# Patient Record
Sex: FEMALE | HISPANIC OR LATINO | Employment: UNEMPLOYED | URBAN - METROPOLITAN AREA
[De-identification: names, ages, dates, MRNs, and addresses within clinical notes are randomized per-mention and may not be internally consistent; named-entity substitution may affect disease eponyms.]

---

## 2023-01-10 ENCOUNTER — OFFICE VISIT (OUTPATIENT)
Dept: OBGYN CLINIC | Facility: CLINIC | Age: 28
End: 2023-01-10

## 2023-01-10 VITALS
BODY MASS INDEX: 32.02 KG/M2 | DIASTOLIC BLOOD PRESSURE: 77 MMHG | HEIGHT: 62 IN | SYSTOLIC BLOOD PRESSURE: 105 MMHG | WEIGHT: 174 LBS | HEART RATE: 102 BPM

## 2023-01-10 DIAGNOSIS — Z30.09 ENCOUNTER FOR COUNSELING REGARDING CONTRACEPTION: Primary | ICD-10-CM

## 2023-01-10 NOTE — PROGRESS NOTES
Assessment/Plan:    Encounter for counseling regarding contraception  Counseled extensively on various BC options   As patient w/ difficulty taking pills and hx of intolerance to other methods mentioned in HPI, recommend Mirena   Device information discussed in detail and handout provided to patient and additional information on AVS   She expresses understanding and wishes to proceed  Schedule apt for IUD insertion   Anticipatory guidance given  Pt expressed understanding, all questions answered        Diagnoses and all orders for this visit:    Encounter for counseling regarding contraception          Subjective:      Patient ID: Natividad Obrien is a  32 y o  female  Zapnip  #[id number]    HPI 33 yo new pt here for contraception  LMP 22 lasted 8 days  Prior BC trials have included depo, IUD, nexplanon  States she has difficulty remembering to take pills  Nicholes Notice states that she did not like depo or nexplanon  States the depo injections gave her headaches and the nexplanon caused her to gain weight  Has hx of copper IUD in place for 7 years and removed 2 months ago in [facility]  States she had a lot of cramping and irregular bleeding with the copper IUD and would prefer to not have that again  Denies chance of possible pregnancy as LMP as above and no encounters between when IUD was removed to now  No hx of STI  Hx of c section in [facility] about 10 years ago - she reports c section occurred d/t her o negative status and lack of health insurance for rhogam injection  No other pregnancies  No other reported health problems  The following portions of the patient's history were reviewed and updated as appropriate: allergies, current medications, past family history, past medical history, past social history, past surgical history and problem list     Review of Systems   Constitutional: Negative for fever     Gastrointestinal: Negative for abdominal pain, constipation, diarrhea and vomiting  Genitourinary: Positive for menstrual problem (in past; not current LMP regular; here for bc counseling)  Negative for dysuria, pelvic pain and vaginal pain  Psychiatric/Behavioral: The patient is not nervous/anxious  All other systems reviewed and are negative  Objective:      /77 (BP Location: Left arm, Patient Position: Sitting, Cuff Size: Adult)   Pulse 102   Ht 5' 2" (1 575 m)   Wt 78 9 kg (174 lb)   LMP 12/12/2022   BMI 31 83 kg/m²      Physical Exam  Vitals reviewed  Constitutional:       General: She is not in acute distress  Appearance: She is not ill-appearing, toxic-appearing or diaphoretic  HENT:      Head: Normocephalic and atraumatic  Mouth/Throat:      Mouth: Mucous membranes are moist    Eyes:      General: No scleral icterus  Cardiovascular:      Rate and Rhythm: Normal rate and regular rhythm  Heart sounds: Normal heart sounds  No murmur heard  Pulmonary:      Effort: No respiratory distress  Breath sounds: Normal breath sounds  No wheezing or rales  Abdominal:      General: Bowel sounds are normal  There is no distension  Palpations: Abdomen is soft  Tenderness: There is no abdominal tenderness  Skin:     General: Skin is warm  Neurological:      Mental Status: She is alert and oriented to person, place, and time     Psychiatric:         Mood and Affect: Mood normal          Behavior: Behavior normal

## 2023-01-10 NOTE — PATIENT INSTRUCTIONS
Levonorgestrel ([facility]) - (En el útero)   ECHO is off  Para qué se utiliza mauricio medicamento:   Sirve para prevenir Verlyn Hum y tratar sangrado menstrual abundante  Comuníquese de inmediato con un médico o enfermera si usted tiene:  Dolor de pecho, problemas para hablar o caminar, entumecimiento o debilidad  Genet sangrado vaginal, flujo vaginal, llagas genitales  Sangrado inusual, moretones o debilidad, piel u ojos amarillos  Dolor dennis el coito o hickman aislinn siente el plástico lynne del DIU dennis el coito  Distensión, dolor de estómago o en la pelvis, espasmos, sensibilidad o cólicos súbitos o intensos  Dolor de cedric, cambios en la visión     Efectos secundarios comunes:  Acné, caspa, piel grasa u otros cambios en la piel  Dolor o molestias en el seno    © Copyright [facility] 2022 Information is for [employer] use only and may not be sold, redistributed or otherwise used for commercial purposes

## 2023-01-10 NOTE — ASSESSMENT & PLAN NOTE
Counseled extensively on various BC options   As patient w/ difficulty taking pills and hx of intolerance to other methods mentioned in HPI, recommend Mirena   Device information discussed in detail and handout provided to patient and additional information on AVS   She expresses understanding and wishes to proceed  Schedule apt for IUD insertion   Anticipatory guidance given  Pt expressed understanding, all questions answered

## 2023-01-27 DIAGNOSIS — Z30.011 ENCOUNTER FOR INITIAL PRESCRIPTION OF CONTRACEPTIVE PILLS: Primary | ICD-10-CM

## 2023-01-27 RX ORDER — NORGESTIMATE AND ETHINYL ESTRADIOL 0.25-0.035
1 KIT ORAL DAILY
Qty: 28 TABLET | Refills: 11 | Status: SHIPPED | OUTPATIENT
Start: 2023-01-27

## 2023-02-13 ENCOUNTER — PATIENT OUTREACH (OUTPATIENT)
Dept: OBGYN CLINIC | Facility: CLINIC | Age: 28
End: 2023-02-13

## 2023-02-13 NOTE — PROGRESS NOTES
FAYE PERALES spoke with Pt to discussed [facility] program  Pt is a 26 y/o-S- P1- Citizen of [place] Speaking woman  Pt resides with her significant other and son  Pt does not have medical insurance and is interested on Mirena  Application was completed and faxed today  Pt aware of the waiting period

## 2023-05-16 ENCOUNTER — OFFICE VISIT (OUTPATIENT)
Dept: OBGYN CLINIC | Facility: CLINIC | Age: 28
End: 2023-05-16

## 2023-05-16 VITALS
DIASTOLIC BLOOD PRESSURE: 68 MMHG | WEIGHT: 180 LBS | SYSTOLIC BLOOD PRESSURE: 102 MMHG | BODY MASS INDEX: 29.99 KG/M2 | HEART RATE: 108 BPM | RESPIRATION RATE: 18 BRPM | HEIGHT: 65 IN

## 2023-05-16 DIAGNOSIS — Z97.5 IUD (INTRAUTERINE DEVICE) IN PLACE: Primary | ICD-10-CM

## 2023-05-16 DIAGNOSIS — Z78.9 LANGUAGE BARRIER: ICD-10-CM

## 2023-05-16 PROBLEM — Z75.8 LANGUAGE BARRIER: Status: ACTIVE | Noted: 2023-05-16

## 2023-05-16 PROBLEM — Z60.3 LANGUAGE BARRIER: Status: ACTIVE | Noted: 2023-05-16

## 2023-05-16 NOTE — PROGRESS NOTES
"Assessment/Plan:    No problem-specific Assessment & Plan notes found for this encounter  Patient to return to office for her annual GYN exam in 2 wks      Diagnoses and all orders for this visit:    IUD (intrauterine device) in place    Language barrier    Other orders  -     Levonorgestrel (MIRENA) 20 MCG/DAY IUD; 1 each by Intrauterine route once          Subjective:      Patient ID: Milana Lee is a 25 y o  female  HPI 40-year-old   here for IUD check, her Mirena IUD was placed 2023  She denies any problems with her iUD  She reports she has been amenorrheic  She denies any pelvic pain or pain with sex  She is happy with method  She is aware of irregular bleeding that can occur with Mirena  She needs annual and pap  The following portions of the patient's history were reviewed and updated as appropriate: allergies, current medications, past family history, past medical history, past social history, past surgical history and problem list     Review of Systems   Respiratory: Negative  Cardiovascular: Negative  Genitourinary: Negative for dyspareunia, menstrual problem and pelvic pain  Objective:      /68 (BP Location: Left arm, Patient Position: Sitting, Cuff Size: Adult)   Pulse (!) 108   Resp 18   Ht 5' 4 57\" (1 64 m)   Wt 81 6 kg (180 lb)   BMI 30 36 kg/m²          Physical Exam  Constitutional:       Appearance: Normal appearance  Cardiovascular:      Rate and Rhythm: Normal rate  Pulmonary:      Effort: Pulmonary effort is normal    Abdominal:      Palpations: Abdomen is soft  Tenderness: There is no abdominal tenderness  Skin:     General: Skin is warm and dry  Neurological:      Mental Status: She is oriented to person, place, and time         External genitalia-no lesions or erythema  Vagina-normal white discharge  Cervix-negative CMT no lesions, IUD strings visible,   Uterus-anteverted, nontender  Adnexa-no masses nontender      "

## 2023-06-13 ENCOUNTER — ANNUAL EXAM (OUTPATIENT)
Dept: OBGYN CLINIC | Facility: CLINIC | Age: 28
End: 2023-06-13

## 2023-06-13 VITALS
BODY MASS INDEX: 29.82 KG/M2 | HEART RATE: 99 BPM | SYSTOLIC BLOOD PRESSURE: 104 MMHG | DIASTOLIC BLOOD PRESSURE: 69 MMHG | WEIGHT: 179 LBS | RESPIRATION RATE: 18 BRPM | HEIGHT: 65 IN

## 2023-06-13 DIAGNOSIS — Z11.51 SCREENING FOR HUMAN PAPILLOMAVIRUS (HPV): ICD-10-CM

## 2023-06-13 DIAGNOSIS — Z78.9 LANGUAGE BARRIER: ICD-10-CM

## 2023-06-13 DIAGNOSIS — Z11.3 SCREEN FOR STD (SEXUALLY TRANSMITTED DISEASE): ICD-10-CM

## 2023-06-13 DIAGNOSIS — Z01.419 ENCOUNTER FOR GYNECOLOGICAL EXAMINATION WITHOUT ABNORMAL FINDING: Primary | ICD-10-CM

## 2023-06-13 DIAGNOSIS — Z97.5 IUD (INTRAUTERINE DEVICE) IN PLACE: ICD-10-CM

## 2023-06-13 DIAGNOSIS — Z23 NEED FOR HPV VACCINATION: ICD-10-CM

## 2023-06-13 PROCEDURE — 99395 PREV VISIT EST AGE 18-39: CPT | Performed by: NURSE PRACTITIONER

## 2023-06-13 PROCEDURE — 87491 CHLMYD TRACH DNA AMP PROBE: CPT | Performed by: NURSE PRACTITIONER

## 2023-06-13 PROCEDURE — 90471 IMMUNIZATION ADMIN: CPT | Performed by: NURSE PRACTITIONER

## 2023-06-13 PROCEDURE — G0476 HPV COMBO ASSAY CA SCREEN: HCPCS | Performed by: NURSE PRACTITIONER

## 2023-06-13 PROCEDURE — 90651 9VHPV VACCINE 2/3 DOSE IM: CPT | Performed by: NURSE PRACTITIONER

## 2023-06-13 PROCEDURE — G0145 SCR C/V CYTO,THINLAYER,RESCR: HCPCS | Performed by: NURSE PRACTITIONER

## 2023-06-13 PROCEDURE — 87591 N.GONORRHOEAE DNA AMP PROB: CPT | Performed by: NURSE PRACTITIONER

## 2023-06-13 NOTE — PROGRESS NOTES
ASSESSMENT & PLAN: Desmond Holder is a 25 y o   with normal gynecologic exam     1   Routine well woman exam done today  2  Pap and HPV:  The patient's last pap and hpv was unknown  Pap and cotesting was done today  Current ASCCP Guidelines reviewed  3   The following were reviewed in today's visit: breast self exam, STD testing, adequate intake of calcium and vitamin D, exercise and healthy diet  4  Gardisil vaccine in women up to age 39 discussed and information was provided  Patient has never received, would like to begin today, Gardasil No  1 given, return to office in 2 months for Gardasil No  2  5  Mirena IUD was placed 2023 effective until       Depression Screening Follow-up Plan: Patient's depression screening was negative with a PHQ-2 score of 0   Their PHQ-9 score was 0  Clinically patient does not have depression  No treatment is required  BMI Counseling: Body mass index is 30 19 kg/m²  The BMI is above normal  Nutrition recommendations include 3-5 servings of fruits/vegetables daily, moderation in carbohydrate intake and reducing intake of cholesterol  Exercise recommendations include exercising 3-5 times per week  CC:  Annual Gynecologic Examination    HPI: Desmond Holder is a 25 y o   who presents for annual gynecologic examination   used [id number]   Had a Mirena IUD placed 2023, happy with method  Health Maintenance:    She wears her seatbelt routinely  She does perform irregular monthly self breast exams  She feels safe at home  History reviewed  No pertinent past medical history  History reviewed  No pertinent surgical history  Past OB/Gyn History:  OB History        1    Para   1    Term                AB        Living   1       SAB        IAB        Ectopic        Multiple        Live Births   1               Pt does not have menstrual issues   Monthly x 7 days, regular  History of sexually transmitted infection: No   History of abnormal pap smears: No      Patient is currently sexually active  heterosexual   The current method of family planning is IUD  History reviewed  No pertinent family history  Social History:  Social History     Socioeconomic History   • Marital status: /Civil Union     Spouse name: Not on file   • Number of children: Not on file   • Years of education: Not on file   • Highest education level: Not on file   Occupational History   • Not on file   Tobacco Use   • Smoking status: Never   • Smokeless tobacco: Never   Vaping Use   • Vaping Use: Never used   Substance and Sexual Activity   • Alcohol use: Not on file   • Drug use: Never   • Sexual activity: Yes   Other Topics Concern   • Not on file   Social History Narrative   • Not on file     Social Determinants of Health     Financial Resource Strain: Low Risk  (4/13/2023)    Overall Financial Resource Strain (CARDIA)    • Difficulty of Paying Living Expenses: Not hard at all   Food Insecurity: No Food Insecurity (4/13/2023)    Hunger Vital Sign    • Worried About Running Out of Food in the Last Year: Never true    • Ran Out of Food in the Last Year: Never true   Transportation Needs: No Transportation Needs (4/13/2023)    PRAPARE - Transportation    • Lack of Transportation (Medical): No    • Lack of Transportation (Non-Medical): No   Physical Activity: Not on file   Stress: Not on file   Social Connections: Not on file   Intimate Partner Violence: Not on file   Housing Stability: Low Risk  (4/13/2023)    Housing Stability Vital Sign    • Unable to Pay for Housing in the Last Year: No    • Number of Places Lived in the Last Year: 1    • Unstable Housing in the Last Year: No     Presently lives with family  Patient is     Patient is currently employed     No Known Allergies      Current Outpatient Medications:   •  Levonorgestrel (MIRENA) 20 MCG/DAY IUD, 1 each by Intrauterine route once, Disp: , Rfl: "      Review of Systems  Constitutional :no fever, feels well, no tiredness, no recent weight gain or loss  ENT: no ear ache, no loss of hearing, no nosebleeds or nasal discharge, no sore throat or hoarseness  Cardiovascular: no complaints of slow or fast heart beat, no chest pain, no palpitations, no leg claudication or lower extremity edema  Respiratory: no complaints of shortness of shortness of breath, no BURKS  Breasts:no complaints of breast pain, breast lump, or nipple discharge  Gastrointestinal: no complaints of abdominal pain, constipation, nausea, vomiting, or diarrhea or bloody stools  Genitourinary : no complaints of dysuria, incontinence, pelvic pain, no dysmenorrhea, vaginal discharge or abnormal vaginal bleeding and as noted in HPI  Musculoskeletal: no complaints of arthralgia, no myalgia, no joint swelling or stiffness, no limb pain or swelling    Integumentary: no complaints of skin rash or lesion, itching or dry skin  Neurological: no complaints of headache, no confusion, no numbness or tingling, no dizziness or fainting    Objective      /69 (BP Location: Right arm, Patient Position: Standing, Cuff Size: Adult)   Pulse 99   Resp 18   Ht 5' 4 57\" (1 64 m)   Wt 81 2 kg (179 lb)   LMP 05/25/2023 (Exact Date)   BMI 30 19 kg/m²   General:   appears stated age, cooperative, alert normal mood and affect   Neck: normal, supple,trachea midline, no masses   Heart: regular rate and rhythm, S1, S2 normal, no murmur, click, rub or gallop   Lungs: clear to auscultation bilaterally   Breasts: normal appearance, no masses or tenderness, Inspection negative, No nipple retraction or dimpling, No nipple discharge or bleeding, No axillary or supraclavicular adenopathy, Normal to palpation without dominant masses, Taught monthly breast self examination   Abdomen: soft, non-tender, without masses or organomegaly   Vulva: normal female genitalia, Bartholin's, Urethra, Bladen normal   Vagina: normal vagina, " no discharge, exudate, lesion, or erythema   Urethra: normal   Cervix: Normal, no discharge  PAP done  IUD strings present  GCC done  Nontender  Uterus: normal size, contour, position, consistency, mobility, non-tender and anteverted   Adnexa: normal adnexa and no mass, fullness, tenderness   Lymphatic palpation of lymph nodes in neck, axilla, groin and/or other locations: no lymphadenopathy or masses noted   Skin normal skin turgor and no rashes     Psychiatric orientation to person, place, and time: normal  mood and affect: normal

## 2023-06-15 LAB
C TRACH DNA SPEC QL NAA+PROBE: NEGATIVE
N GONORRHOEA DNA SPEC QL NAA+PROBE: NEGATIVE

## 2023-06-16 ENCOUNTER — TELEPHONE (OUTPATIENT)
Dept: OBGYN CLINIC | Facility: CLINIC | Age: 28
End: 2023-06-16

## 2023-06-16 LAB
HPV HR 12 DNA CVX QL NAA+PROBE: POSITIVE
HPV16 DNA CVX QL NAA+PROBE: NEGATIVE
HPV18 DNA CVX QL NAA+PROBE: NEGATIVE

## 2023-06-16 NOTE — TELEPHONE ENCOUNTER
----- Message from Ariella Cannon, [address] sent at 6/16/2023 10:40 AM EDT -----  Please inform pt that her culture for chlamydia and gonorrhea were negative  Her HPV is positive for other, awaiting on pap results  Thank You!

## 2023-06-16 NOTE — TELEPHONE ENCOUNTER
ID [id number]    Attempted to call, left a message asking for patient to call the office  Office number provided in message

## 2023-06-19 ENCOUNTER — TELEPHONE (OUTPATIENT)
Dept: OBGYN CLINIC | Facility: CLINIC | Age: 28
End: 2023-06-19

## 2023-06-19 NOTE — TELEPHONE ENCOUNTER
ID [id number]    Attempted to call, left a message referring patient to my chart for test results, office number provided

## 2023-06-19 NOTE — TELEPHONE ENCOUNTER
----- Message from Trice Jones, [address]  sent at 6/16/2023 10:40 AM EDT -----  Please inform pt that her culture for chlamydia and gonorrhea were negative  Her HPV is positive for other, awaiting on pap results  Thank You!

## 2023-06-20 LAB
LAB AP GYN PRIMARY INTERPRETATION: NORMAL
Lab: NORMAL
PATH INTERP SPEC-IMP: NORMAL

## 2023-06-21 ENCOUNTER — TELEPHONE (OUTPATIENT)
Dept: OBGYN CLINIC | Facility: CLINIC | Age: 28
End: 2023-06-21

## 2023-06-21 NOTE — TELEPHONE ENCOUNTER
ID [id number]    Attempted to call, left a message asking for patient to call the office   Office number provided in message

## 2023-06-21 NOTE — TELEPHONE ENCOUNTER
----- Message from  , Michael Dawkins sent at 6/21/2023 10:56 AM EDT -----  Please call Hakeem Boateng and let her know that her Pap was negative, but positive for HPV recommend Pap and cotest in 1 year  Her Pap did suggest BV please inquire if she has any symptoms    Thank you

## 2023-06-22 ENCOUNTER — TELEPHONE (OUTPATIENT)
Dept: OBGYN CLINIC | Facility: CLINIC | Age: 28
End: 2023-06-22

## 2023-06-22 NOTE — TELEPHONE ENCOUNTER
----- Message from Emy Lo [place] sent at 6/21/2023 10:56 AM EDT -----  Please call Opal Hauser and let her know that her Pap was negative, but positive for HPV recommend Pap and cotest in 1 year  Her Pap did suggest BV please inquire if she has any symptoms    Thank you

## 2023-06-27 ENCOUNTER — TELEPHONE (OUTPATIENT)
Dept: OBGYN CLINIC | Facility: CLINIC | Age: 28
End: 2023-06-27

## 2023-06-27 DIAGNOSIS — B96.89 BACTERIAL VAGINOSIS: Primary | ICD-10-CM

## 2023-06-27 DIAGNOSIS — N76.0 BACTERIAL VAGINOSIS: Primary | ICD-10-CM

## 2023-06-27 RX ORDER — METRONIDAZOLE 500 MG/1
500 TABLET ORAL EVERY 12 HOURS SCHEDULED
Qty: 14 TABLET | Refills: 0 | Status: SHIPPED | OUTPATIENT
Start: 2023-06-27 | End: 2023-07-04

## 2023-08-12 PROBLEM — Z11.3 SCREEN FOR STD (SEXUALLY TRANSMITTED DISEASE): Status: RESOLVED | Noted: 2023-06-13 | Resolved: 2023-08-12

## 2023-08-12 PROBLEM — Z11.51 SCREENING FOR HUMAN PAPILLOMAVIRUS (HPV): Status: RESOLVED | Noted: 2023-06-13 | Resolved: 2023-08-12

## 2023-08-14 ENCOUNTER — CLINICAL SUPPORT (OUTPATIENT)
Dept: OBGYN CLINIC | Facility: CLINIC | Age: 28
End: 2023-08-14

## 2023-08-14 DIAGNOSIS — Z23 NEED FOR VACCINATION: Primary | ICD-10-CM

## 2023-08-14 PROCEDURE — 90471 IMMUNIZATION ADMIN: CPT

## 2023-08-14 PROCEDURE — 90651 9VHPV VACCINE 2/3 DOSE IM: CPT

## 2023-11-13 ENCOUNTER — CLINICAL SUPPORT (OUTPATIENT)
Dept: OBGYN CLINIC | Facility: CLINIC | Age: 28
End: 2023-11-13

## 2023-11-13 DIAGNOSIS — Z23 NEED FOR VACCINATION: Primary | ICD-10-CM

## 2023-11-13 PROCEDURE — 90651 9VHPV VACCINE 2/3 DOSE IM: CPT

## 2023-11-13 PROCEDURE — 90471 IMMUNIZATION ADMIN: CPT

## 2024-02-19 ENCOUNTER — HOSPITAL ENCOUNTER (EMERGENCY)
Facility: HOSPITAL | Age: 29
Discharge: HOME | End: 2024-02-19
Attending: EMERGENCY MEDICINE | Admitting: EMERGENCY MEDICINE

## 2024-02-19 ENCOUNTER — APPOINTMENT (EMERGENCY)
Dept: RADIOLOGY | Facility: HOSPITAL | Age: 29
End: 2024-02-19

## 2024-02-19 VITALS
RESPIRATION RATE: 16 BRPM | BODY MASS INDEX: 29.68 KG/M2 | WEIGHT: 178.13 LBS | DIASTOLIC BLOOD PRESSURE: 67 MMHG | HEART RATE: 107 BPM | SYSTOLIC BLOOD PRESSURE: 105 MMHG | OXYGEN SATURATION: 99 % | HEIGHT: 65 IN | TEMPERATURE: 98.4 F

## 2024-02-19 DIAGNOSIS — M25.561 ACUTE PAIN OF RIGHT KNEE: Primary | ICD-10-CM

## 2024-02-19 PROCEDURE — 73564 X-RAY EXAM KNEE 4 OR MORE: CPT

## 2024-02-19 PROCEDURE — 99284 EMERGENCY DEPT VISIT MOD MDM: CPT | Performed by: EMERGENCY MEDICINE

## 2024-02-19 PROCEDURE — 96372 THER/PROPH/DIAG INJ SC/IM: CPT

## 2024-02-19 PROCEDURE — 99283 EMERGENCY DEPT VISIT LOW MDM: CPT

## 2024-02-19 RX ORDER — IBUPROFEN 600 MG/1
600 TABLET ORAL EVERY 6 HOURS PRN
Qty: 30 TABLET | Refills: 0 | Status: SHIPPED | OUTPATIENT
Start: 2024-02-19

## 2024-02-19 RX ORDER — KETOROLAC TROMETHAMINE 30 MG/ML
30 INJECTION, SOLUTION INTRAMUSCULAR; INTRAVENOUS ONCE
Status: COMPLETED | OUTPATIENT
Start: 2024-02-19 | End: 2024-02-19

## 2024-02-19 RX ADMIN — KETOROLAC TROMETHAMINE 30 MG: 30 INJECTION, SOLUTION INTRAMUSCULAR; INTRAVENOUS at 21:03

## 2024-02-20 NOTE — ED PROVIDER NOTES
History  Chief Complaint   Patient presents with    Knee Swelling     Reports R knee pain and swelling since Friday, worse when applying pressure and using stairs     29-year-old female presents to the emergency department for evaluation of knee pain.  Patient reports worsening right knee pain over the past 3 days.  States that she has been using Motrin and naproxen with only minimal relief so came to the emergency department for further evaluation.  She denies any recent falls or direct trauma to the area.  States that the pain is worse when she bends her knees and when she is going upstairs.  Denies prior history of similar.  No fevers, chills, nausea, vomiting, diarrhea, shortness of breath and lower leg pain/swelling.        Prior to Admission Medications   Prescriptions Last Dose Informant Patient Reported? Taking?   Levonorgestrel (MIRENA) 20 MCG/DAY IUD  Self Yes No   Si each by Intrauterine route once      Facility-Administered Medications: None       History reviewed. No pertinent past medical history.    Past Surgical History:   Procedure Laterality Date     SECTION         Family History   Problem Relation Age of Onset    No Known Problems Mother     No Known Problems Father     No Known Problems Son     No Known Problems Maternal Grandmother     Breast cancer Neg Hx     Colon cancer Neg Hx     Ovarian cancer Neg Hx      I have reviewed and agree with the history as documented.    E-Cigarette/Vaping    E-Cigarette Use Never User      E-Cigarette/Vaping Substances     Social History     Tobacco Use    Smoking status: Never    Smokeless tobacco: Never   Vaping Use    Vaping status: Never Used   Substance Use Topics    Alcohol use: Not Currently    Drug use: Never       Review of Systems   Constitutional:  Negative for chills and fever.   HENT:  Negative for ear pain and sore throat.    Eyes:  Negative for pain and visual disturbance.   Respiratory:  Negative for cough and shortness of breath.     Cardiovascular:  Negative for chest pain and palpitations.   Gastrointestinal:  Negative for abdominal pain and vomiting.   Genitourinary:  Negative for dysuria and hematuria.   Musculoskeletal:  Negative for arthralgias and back pain.   Skin:  Negative for color change and rash.   Neurological:  Negative for seizures and syncope.   All other systems reviewed and are negative.      Physical Exam  Physical Exam  Vitals and nursing note reviewed.   Constitutional:       General: She is not in acute distress.     Appearance: She is well-developed.   HENT:      Head: Normocephalic and atraumatic.   Eyes:      Conjunctiva/sclera: Conjunctivae normal.   Cardiovascular:      Rate and Rhythm: Regular rhythm. Tachycardia present.      Pulses:           Dorsalis pedis pulses are 2+ on the right side and 2+ on the left side.      Heart sounds: No murmur heard.  Pulmonary:      Effort: Pulmonary effort is normal. No respiratory distress.      Breath sounds: Normal breath sounds.   Abdominal:      Palpations: Abdomen is soft.      Tenderness: There is no abdominal tenderness.   Musculoskeletal:         General: Tenderness present. No swelling.      Cervical back: Neck supple.      Right knee: Tenderness present.      Left knee: Normal.   Skin:     General: Skin is warm and dry.      Capillary Refill: Capillary refill takes less than 2 seconds.   Neurological:      Mental Status: She is alert.   Psychiatric:         Mood and Affect: Mood normal.         Vital Signs  ED Triage Vitals [02/19/24 2044]   Temperature Pulse Respirations Blood Pressure SpO2   98.4 °F (36.9 °C) (!) 107 16 105/67 99 %      Temp Source Heart Rate Source Patient Position - Orthostatic VS BP Location FiO2 (%)   Oral Monitor Sitting Left arm --      Pain Score       10 - Worst Possible Pain           Vitals:    02/19/24 2044   BP: 105/67   Pulse: (!) 107   Patient Position - Orthostatic VS: Sitting         Visual Acuity      ED Medications  Medications    ketorolac (TORADOL) injection 30 mg (30 mg Intramuscular Given 2/19/24 2103)       Diagnostic Studies  Results Reviewed       None                   XR knee 4+ views Right injury   ED Interpretation by Wilfredo Lucio MD (02/19 2134)   No acute fracture or dislocation      Final Result by Gokul Esquivel MD (02/20 1512)      No acute osseous abnormality.            Workstation performed: [id number]                    Procedures  Procedures         ED Course                               SBIRT 20yo+      Flowsheet Row Most Recent Value   Initial Alcohol Screen: US AUDIT-C     1. How often do you have a drink containing alcohol? 0 Filed at: 02/19/2024 2059   2. How many drinks containing alcohol do you have on a typical day you are drinking?  0 Filed at: 02/19/2024 2059   3a. Male UNDER 65: How often do you have five or more drinks on one occasion? 0 Filed at: 02/19/2024 2059   3b. FEMALE Any Age, or MALE 65+: How often do you have 4 or more drinks on one occassion? 0 Filed at: 02/19/2024 2059   Audit-C Score 0 Filed at: 02/19/2024 2059   ELIDA: How many times in the past year have you...    Used an illegal drug or used a prescription medication for non-medical reasons? Never Filed at: 02/19/2024 2059                      Medical Decision Making  29-year-old female presented to the emergency department for evaluation of knee pain.  On arrival the patient was awake, alert, oriented and in no acute distress.  On exam the patient had tenderness to the medial aspect of her right knee.  Physical exam was otherwise unremarkable.  The patient's knee was stable.  Imaging done in the emergency department on my interpretation with no acute fracture or dislocation.  Patient was treated symptomatically with Toradol.  On reevaluation the patient reported some improvement of symptoms.  All diagnostic studies were discussed with the patient in detail.  She is appropriate for discharge.  Recommendation was made for the  patient to follow-up with orthopedic surgery for continued symptoms.  An ambulatory referral was placed.  Return precautions were discussed.    Patient agrees with the plan for discharge and feels comfortable to go home with proper f/u. Advised to return for worsening or additional problems. Diagnostic tests were reviewed and questions answered. Diagnosis, care plan and treatment options were discussed. The patient understands instructions and will follow up as directed.        Amount and/or Complexity of Data Reviewed  Radiology: ordered and independent interpretation performed.    Risk  Prescription drug management.             Disposition  Final diagnoses:   Acute pain of right knee     Time reflects when diagnosis was documented in both MDM as applicable and the Disposition within this note       Time User Action Codes Description Comment    2/19/2024  9:35 PM Wilfredo Lucio Add [M25.561] Acute pain of right knee           ED Disposition       ED Disposition   Discharge    Condition   Stable    Date/Time   Mon Feb 19, 2024 2135    Comment   Christina West discharge to home/self care.                   Follow-up Information       Follow up With Specialties Details Why Contact Info Additional Information    [facility] Schedule an appointment as soon as possible for a visit   [address] [place] [address] [phone] [facility] [address] [address] [phone]    [facility] Schedule an appointment as soon as possible for a visit   [address] [place] [address] [phone] [facility], [address] [place] [address], [place], [place], [address] [phone]    [facility] Emergency Department Emergency Medicine Go to  If symptoms worsen [address] [place] [address]  [facility] [address]  [phone] [facility] Emergency Department, [address] [place] [id number]            Discharge Medication List as of 2/19/2024  9:39 PM        START taking these medications    Details   ibuprofen (MOTRIN) 600 mg tablet Take 1 tablet (600 mg total) by mouth every 6 (six) hours as needed for mild pain or moderate pain, Starting Mon 2/19/2024, Normal           CONTINUE these medications which have NOT CHANGED    Details   Levonorgestrel (MIRENA) 20 MCG/DAY IUD 1 each by Intrauterine route once, Historical Med                 PDMP Review       None            ED Provider  Electronically Signed by             Wilfredo Lucio MD  02/20/24 8310

## 2024-02-20 NOTE — ED NOTES
Discharge instructions reviewed with pt. Pt verbalized understanding. And has no further questions at this time. Pt ambulatory off unit with steady gait.      Kia De Oliveira RN  02/19/24 5997

## 2024-02-21 PROBLEM — Z01.419 ENCOUNTER FOR GYNECOLOGICAL EXAMINATION WITHOUT ABNORMAL FINDING: Status: RESOLVED | Noted: 2023-06-13 | Resolved: 2024-02-21

## 2024-03-06 ENCOUNTER — OFFICE VISIT (OUTPATIENT)
Dept: OBGYN CLINIC | Facility: CLINIC | Age: 29
End: 2024-03-06

## 2024-03-06 VITALS
DIASTOLIC BLOOD PRESSURE: 73 MMHG | HEIGHT: 65 IN | BODY MASS INDEX: 29.69 KG/M2 | HEART RATE: 92 BPM | WEIGHT: 178.2 LBS | SYSTOLIC BLOOD PRESSURE: 109 MMHG | RESPIRATION RATE: 16 BRPM

## 2024-03-06 DIAGNOSIS — M25.561 ACUTE PAIN OF RIGHT KNEE: ICD-10-CM

## 2024-03-06 DIAGNOSIS — M23.91 INTERNAL DERANGEMENT OF RIGHT KNEE: Primary | ICD-10-CM

## 2024-03-06 PROCEDURE — 99244 OFF/OP CNSLTJ NEW/EST MOD 40: CPT | Performed by: STUDENT IN AN ORGANIZED HEALTH CARE EDUCATION/TRAINING PROGRAM

## 2024-03-06 RX ORDER — IBUPROFEN 600 MG/1
600 TABLET ORAL EVERY 6 HOURS PRN
Qty: 30 TABLET | Refills: 0 | Status: SHIPPED | OUTPATIENT
Start: 2024-03-06

## 2024-03-06 NOTE — LETTER
March 6, 2024     Wilfredo Lucio MD  [address] [place] [id number]    Patient: Christina West   YOB: 1995   Date of Visit: 3/6/2024       Dear Dr. Lucio:    Thank you for referring Christina West to me for evaluation. Below are my notes for this consultation.    If you have questions, please do not hesitate to call me. I look forward to following your patient along with you.         Sincerely,        Calvin Cheung, DO        CC: No Recipients    Calvin Cheung DO  3/6/2024  6:43 PM  Incomplete  Ortho Sports Medicine Knee New Patient Visit     Assesment:   29 y.o. female right knee internal derangement with concern for meniscus tear    Plan:    Christina presents today for right knee pain. The Ipad was used for Mongolian interpretation during today's visit. Her exam is consistent with internal derangement with a concern for a meniscus tear. I recommend we get an MRI for further evaluation. She describes mechanical catching and locking in her medial knee. I provided her with a outpatient physical therapy referral to work on range of motion and reducing swelling. I will see her back following her MRI.    Conservative treatment:    PT for ROM/strengthening to knee, hip and core.  OTC NSAIDS prn for pain.  Tylenol for pain.  Let pain guide gradual return activities.    Imaging:    All imaging from today was reviewed by myself and explained to the patient.       Injection:    No Injection planned at this time.      Surgery:     No surgery is recommended at this point, continue with conservative treatment plan as noted.      Follow up:    No follow-ups on file.        Chief Complaint   Patient presents with   • Right Knee - Pain       History of Present Illness:    The patient is a 29 y.o. female who presents for right knee pain. She has pain over the medial aspect of her knee. She notes having pain during prolonged walking and deep knee flexion. She notes having crunching  sounds in her knee during walking. She notes one time squatting to  her newspaper and having pain following. She has been taking ibuprofen to help with her pain. She has not had any formal treatment. She denies any numbness and tingling.  She does endorse mechanical catching and locking in the knee.      Knee Surgical History:  None    Past Medical, Social and Family History:  History reviewed. No pertinent past medical history.  Past Surgical History:   Procedure Laterality Date   •  SECTION       No Known Allergies  Current Outpatient Medications on File Prior to Visit   Medication Sig Dispense Refill   • ibuprofen (MOTRIN) 600 mg tablet Take 1 tablet (600 mg total) by mouth every 6 (six) hours as needed for mild pain or moderate pain 30 tablet 0   • Levonorgestrel (MIRENA) 20 MCG/DAY IUD 1 each by Intrauterine route once       No current facility-administered medications on file prior to visit.     Social History     Socioeconomic History   • Marital status: /Civil Union     Spouse name: Not on file   • Number of children: Not on file   • Years of education: Not on file   • Highest education level: Not on file   Occupational History   • Not on file   Tobacco Use   • Smoking status: Never   • Smokeless tobacco: Never   Vaping Use   • Vaping status: Never Used   Substance and Sexual Activity   • Alcohol use: Not Currently   • Drug use: Never   • Sexual activity: Yes     Birth control/protection: I.U.D.   Other Topics Concern   • Not on file   Social History Narrative   • Not on file     Social Determinants of Health     Financial Resource Strain: Low Risk  (2023)    Overall Financial Resource Strain (CARDIA)    • Difficulty of Paying Living Expenses: Not hard at all   Food Insecurity: No Food Insecurity (2023)    Hunger Vital Sign    • Worried About Running Out of Food in the Last Year: Never true    • Ran Out of Food in the Last Year: Never true   Transportation Needs: No  "Transportation Needs (4/13/2023)    PRAPARE - Transportation    • Lack of Transportation (Medical): No    • Lack of Transportation (Non-Medical): No   Physical Activity: Not on file   Stress: Not on file   Social Connections: Not on file   Intimate Partner Violence: Not on file   Housing Stability: Low Risk  (4/13/2023)    Housing Stability Vital Sign    • Unable to Pay for Housing in the Last Year: No    • Number of Places Lived in the Last Year: 1    • Unstable Housing in the Last Year: No         I have reviewed the past medical, surgical, social and family history, medications and allergies as documented in the EMR.    Review of systems: ROS is negative other than that noted in the HPI.  Constitutional: Negative for fatigue and fever.   HENT: Negative for sore throat.    Respiratory: Negative for shortness of breath.    Cardiovascular: Negative for chest pain.   Gastrointestinal: Negative for abdominal pain.   Endocrine: Negative for cold intolerance and heat intolerance.   Genitourinary: Negative for flank pain.   Musculoskeletal: Negative for back pain.   Skin: Negative for rash.   Allergic/Immunologic: Negative for immunocompromised state.   Neurological: Negative for dizziness.   Psychiatric/Behavioral: Negative for agitation.      Physical Exam:    Blood pressure 109/73, pulse 92, resp. rate 16, height 5' 4.7\" (1.643 m), weight 80.8 kg (178 lb 3.2 oz), last menstrual period 01/20/2024.    General/Constitutional: NAD, well developed, well nourished  HENT: Normocephalic, atraumatic  CV: Intact distal pulses, regular rate  Resp: No respiratory distress or labored breathing  Lymphatic: No lymphadenopathy palpated  Neuro: Alert and Oriented x 3, no focal deficits  Psych: Normal mood, normal affect, normal judgement, normal behavior  Skin: Warm, dry, no rashes, no erythema      Knee Exam (focused):  Visual inspection of the right knee demonstrates normal contour without atrophy.   no previous incisions   There is " no significant erythema or edema.    No significant joint effusion   Range of motion is full from 0-110 degrees of flexion   Able to straight leg raise   (+) medial joint line tenderness, (-) lateral joint line tenderness  (+) medial Carlos Manuel's, (-) lateral Carlos Manuel's  1A Lachman exam, (-) posterior drawer  Stable to varus and valgus stress at both 0 and 30°  Patella tracks normally.  No J sign.  No apprehension.  Translation is approximately 2 quadrants and is equal to the contralateral side  Patellar eversion is similar to the contralateral side    Examination of the patient's ipsilateral hip demonstrates full painless range of motion.  No crepitus.      LE NV Exam: +2 DP/PT pulses bilaterally  Sensation intact to light touch L2-S1 bilaterally     Bilateral hip ROM demonstrates no pain actively or passively    No calf tenderness to palpation bilaterally    Knee Imaging    X-rays of the right knee were reviewed, which demonstrate no osseous abnormalities.  I have reviewed the radiology report and agree with their impression.      Scribe Attestation      I,:  Ambrose Reilly am acting as a scribe while in the presence of the attending physician.:       I,:  Calvin Cheung, DO personally performed the services described in this documentation    as scribed in my presence.:              Ambrose Reilly  3/6/2024  2:55 PM  Sign when Signing Visit  Ortho Sports Medicine Knee New Patient Visit     Assesment:   29 y.o. female right knee internal derangement with concern for meniscus tear    Plan:    Christina presents today for right knee pain. The Ipad was used for [place]ian interpretation during today's visit. Her exam is consistent with internal derangement with a concern for a meniscus tear. I recommend we get an MRI for further evaluation. I provided her with a outpatient physical therapy referral to work on range of motion and reducing swelling. I will see her back following her MRI.    Conservative treatment:    PT  for ROM/strengthening to knee, hip and core.  OTC NSAIDS prn for pain.  Tylenol for pain.  Let pain guide gradual return activities.    Imaging:    All imaging from today was reviewed by myself and explained to the patient.       Injection:    No Injection planned at this time.      Surgery:     No surgery is recommended at this point, continue with conservative treatment plan as noted.      Follow up:    No follow-ups on file.        Chief Complaint   Patient presents with   • Right Knee - Pain       History of Present Illness:    The patient is a 29 y.o. female who presents for right knee pain. She has pain over the medial aspect of her knee. She notes having pain during prolonged walking and deep knee flexion. She notes having crunching sounds in her knee during walking. She notes one time squatting to  her newspaper and having pain following. She has been taking ibuprofen to help with her pain. She has not had any formal treatment. She denies any numbness and tingling.       Knee Surgical History:  None    Past Medical, Social and Family History:  History reviewed. No pertinent past medical history.  Past Surgical History:   Procedure Laterality Date   •  SECTION       No Known Allergies  Current Outpatient Medications on File Prior to Visit   Medication Sig Dispense Refill   • ibuprofen (MOTRIN) 600 mg tablet Take 1 tablet (600 mg total) by mouth every 6 (six) hours as needed for mild pain or moderate pain 30 tablet 0   • Levonorgestrel (MIRENA) 20 MCG/DAY IUD 1 each by Intrauterine route once       No current facility-administered medications on file prior to visit.     Social History     Socioeconomic History   • Marital status: /Civil Union     Spouse name: Not on file   • Number of children: Not on file   • Years of education: Not on file   • Highest education level: Not on file   Occupational History   • Not on file   Tobacco Use   • Smoking status: Never   • Smokeless tobacco: Never    Vaping Use   • Vaping status: Never Used   Substance and Sexual Activity   • Alcohol use: Not Currently   • Drug use: Never   • Sexual activity: Yes     Birth control/protection: I.U.D.   Other Topics Concern   • Not on file   Social History Narrative   • Not on file     Social Determinants of Health     Financial Resource Strain: Low Risk  (4/13/2023)    Overall Financial Resource Strain (CARDIA)    • Difficulty of Paying Living Expenses: Not hard at all   Food Insecurity: No Food Insecurity (4/13/2023)    Hunger Vital Sign    • Worried About Running Out of Food in the Last Year: Never true    • Ran Out of Food in the Last Year: Never true   Transportation Needs: No Transportation Needs (4/13/2023)    PRAPARE - Transportation    • Lack of Transportation (Medical): No    • Lack of Transportation (Non-Medical): No   Physical Activity: Not on file   Stress: Not on file   Social Connections: Not on file   Intimate Partner Violence: Not on file   Housing Stability: Low Risk  (4/13/2023)    Housing Stability Vital Sign    • Unable to Pay for Housing in the Last Year: No    • Number of Places Lived in the Last Year: 1    • Unstable Housing in the Last Year: No         I have reviewed the past medical, surgical, social and family history, medications and allergies as documented in the EMR.    Review of systems: ROS is negative other than that noted in the HPI.  Constitutional: Negative for fatigue and fever.   HENT: Negative for sore throat.    Respiratory: Negative for shortness of breath.    Cardiovascular: Negative for chest pain.   Gastrointestinal: Negative for abdominal pain.   Endocrine: Negative for cold intolerance and heat intolerance.   Genitourinary: Negative for flank pain.   Musculoskeletal: Negative for back pain.   Skin: Negative for rash.   Allergic/Immunologic: Negative for immunocompromised state.   Neurological: Negative for dizziness.   Psychiatric/Behavioral: Negative for agitation.      Physical  "Exam:    Blood pressure 109/73, pulse 92, resp. rate 16, height 5' 4.7\" (1.643 m), weight 80.8 kg (178 lb 3.2 oz), last menstrual period 01/20/2024.    General/Constitutional: NAD, well developed, well nourished  HENT: Normocephalic, atraumatic  CV: Intact distal pulses, regular rate  Resp: No respiratory distress or labored breathing  Lymphatic: No lymphadenopathy palpated  Neuro: Alert and Oriented x 3, no focal deficits  Psych: Normal mood, normal affect, normal judgement, normal behavior  Skin: Warm, dry, no rashes, no erythema      Knee Exam (focused):  Visual inspection of the right knee demonstrates normal contour without atrophy.   no previous incisions   There is no significant erythema or edema.    No significant joint effusion   Range of motion is full from 0-130 degrees of flexion   Able to straight leg raise   (+) medial joint line tenderness, (-) lateral joint line tenderness  (+) medial Carlos Manuel's, (-) lateral Carlos Manuel's  1A Lachman exam, (-) posterior drawer  Stable to varus and valgus stress at both 0 and 30°  Patella tracks normally.  No J sign.  No apprehension.  Translation is approximately 2 quadrants and is equal to the contralateral side  Patellar eversion is similar to the contralateral side    Examination of the patient's ipsilateral hip demonstrates full painless range of motion.  No crepitus.      LE NV Exam: +2 DP/PT pulses bilaterally  Sensation intact to light touch L2-S1 bilaterally     Bilateral hip ROM demonstrates no pain actively or passively    No calf tenderness to palpation bilaterally    Knee Imaging    X-rays of the right knee were reviewed, which demonstrate no osseous abnormalities.  I have reviewed the radiology report and agree with their impression.      Scribe Attestation      I,:  Ambrose Reilly am acting as a scribe while in the presence of the attending physician.:       I,:  Calvin Cheung, DO personally performed the services described in this documentation    " as scribed in my presence.:

## 2024-03-06 NOTE — PROGRESS NOTES
Ortho Sports Medicine Knee New Patient Visit     Assesment:   29 y.o. female right knee internal derangement with concern for meniscus tear    Plan:    Christina presents today for right knee pain. The Ipad was used for Maldivian interpretation during today's visit. Her exam is consistent with internal derangement with a concern for a meniscus tear. I recommend we get an MRI for further evaluation. She describes mechanical catching and locking in her medial knee. I provided her with a outpatient physical therapy referral to work on range of motion and reducing swelling. I will see her back following her MRI.    Conservative treatment:    PT for ROM/strengthening to knee, hip and core.  OTC NSAIDS prn for pain.  Tylenol for pain.  Let pain guide gradual return activities.    Imaging:    All imaging from today was reviewed by myself and explained to the patient.       Injection:    No Injection planned at this time.      Surgery:     No surgery is recommended at this point, continue with conservative treatment plan as noted.      Follow up:    No follow-ups on file.        Chief Complaint   Patient presents with    Right Knee - Pain       History of Present Illness:    The patient is a 29 y.o. female who presents for right knee pain. She has pain over the medial aspect of her knee. She notes having pain during prolonged walking and deep knee flexion. She notes having crunching sounds in her knee during walking. She notes one time squatting to  her newspaper and having pain following. She has been taking ibuprofen to help with her pain. She has not had any formal treatment. She denies any numbness and tingling.  She does endorse mechanical catching and locking in the knee.      Knee Surgical History:  None    Past Medical, Social and Family History:  History reviewed. No pertinent past medical history.  Past Surgical History:   Procedure Laterality Date     SECTION       No Known Allergies  Current Outpatient  Medications on File Prior to Visit   Medication Sig Dispense Refill    ibuprofen (MOTRIN) 600 mg tablet Take 1 tablet (600 mg total) by mouth every 6 (six) hours as needed for mild pain or moderate pain 30 tablet 0    Levonorgestrel (MIRENA) 20 MCG/DAY IUD 1 each by Intrauterine route once       No current facility-administered medications on file prior to visit.     Social History     Socioeconomic History    Marital status: /Civil Union     Spouse name: Not on file    Number of children: Not on file    Years of education: Not on file    Highest education level: Not on file   Occupational History    Not on file   Tobacco Use    Smoking status: Never    Smokeless tobacco: Never   Vaping Use    Vaping status: Never Used   Substance and Sexual Activity    Alcohol use: Not Currently    Drug use: Never    Sexual activity: Yes     Birth control/protection: I.U.D.   Other Topics Concern    Not on file   Social History Narrative    Not on file     Social Determinants of Health     Financial Resource Strain: Low Risk  (4/13/2023)    Overall Financial Resource Strain (CARDIA)     Difficulty of Paying Living Expenses: Not hard at all   Food Insecurity: No Food Insecurity (4/13/2023)    Hunger Vital Sign     Worried About Running Out of Food in the Last Year: Never true     Ran Out of Food in the Last Year: Never true   Transportation Needs: No Transportation Needs (4/13/2023)    PRAPARE - Transportation     Lack of Transportation (Medical): No     Lack of Transportation (Non-Medical): No   Physical Activity: Not on file   Stress: Not on file   Social Connections: Not on file   Intimate Partner Violence: Not on file   Housing Stability: Low Risk  (4/13/2023)    Housing Stability Vital Sign     Unable to Pay for Housing in the Last Year: No     Number of Places Lived in the Last Year: 1     Unstable Housing in the Last Year: No         I have reviewed the past medical, surgical, social and family history, medications and  "allergies as documented in the EMR.    Review of systems: ROS is negative other than that noted in the HPI.  Constitutional: Negative for fatigue and fever.   HENT: Negative for sore throat.    Respiratory: Negative for shortness of breath.    Cardiovascular: Negative for chest pain.   Gastrointestinal: Negative for abdominal pain.   Endocrine: Negative for cold intolerance and heat intolerance.   Genitourinary: Negative for flank pain.   Musculoskeletal: Negative for back pain.   Skin: Negative for rash.   Allergic/Immunologic: Negative for immunocompromised state.   Neurological: Negative for dizziness.   Psychiatric/Behavioral: Negative for agitation.      Physical Exam:    Blood pressure 109/73, pulse 92, resp. rate 16, height 5' 4.7\" (1.643 m), weight 80.8 kg (178 lb 3.2 oz), last menstrual period 01/20/2024.    General/Constitutional: NAD, well developed, well nourished  HENT: Normocephalic, atraumatic  CV: Intact distal pulses, regular rate  Resp: No respiratory distress or labored breathing  Lymphatic: No lymphadenopathy palpated  Neuro: Alert and Oriented x 3, no focal deficits  Psych: Normal mood, normal affect, normal judgement, normal behavior  Skin: Warm, dry, no rashes, no erythema      Knee Exam (focused):  Visual inspection of the right knee demonstrates normal contour without atrophy.   no previous incisions   There is no significant erythema or edema.    No significant joint effusion   Range of motion is full from 0-110 degrees of flexion   Able to straight leg raise   (+) medial joint line tenderness, (-) lateral joint line tenderness  (+) medial Carlos Manuel's, (-) lateral Carlos Manuel's  1A Lachman exam, (-) posterior drawer  Stable to varus and valgus stress at both 0 and 30°  Patella tracks normally.  No J sign.  No apprehension.  Translation is approximately 2 quadrants and is equal to the contralateral side  Patellar eversion is similar to the contralateral side    Examination of the patient's " ipsilateral hip demonstrates full painless range of motion.  No crepitus.      LE NV Exam: +2 DP/PT pulses bilaterally  Sensation intact to light touch L2-S1 bilaterally     Bilateral hip ROM demonstrates no pain actively or passively    No calf tenderness to palpation bilaterally    Knee Imaging    X-rays of the right knee were reviewed, which demonstrate no osseous abnormalities.  I have reviewed the radiology report and agree with their impression.      Scribe Attestation      I,:  Ambrose Reilly am acting as a scribe while in the presence of the attending physician.:       I,:  Calvin Cheung, DO personally performed the services described in this documentation    as scribed in my presence.:

## 2024-03-14 ENCOUNTER — HOSPITAL ENCOUNTER (OUTPATIENT)
Dept: MRI IMAGING | Facility: HOSPITAL | Age: 29
End: 2024-03-14
Attending: STUDENT IN AN ORGANIZED HEALTH CARE EDUCATION/TRAINING PROGRAM

## 2024-03-14 DIAGNOSIS — M23.91 INTERNAL DERANGEMENT OF RIGHT KNEE: ICD-10-CM

## 2024-03-14 PROCEDURE — 73721 MRI JNT OF LWR EXTRE W/O DYE: CPT

## 2024-03-27 ENCOUNTER — OFFICE VISIT (OUTPATIENT)
Dept: OBGYN CLINIC | Facility: CLINIC | Age: 29
End: 2024-03-27

## 2024-03-27 VITALS
RESPIRATION RATE: 16 BRPM | HEIGHT: 65 IN | DIASTOLIC BLOOD PRESSURE: 67 MMHG | WEIGHT: 178.3 LBS | HEART RATE: 99 BPM | SYSTOLIC BLOOD PRESSURE: 101 MMHG | BODY MASS INDEX: 29.71 KG/M2

## 2024-03-27 DIAGNOSIS — S83.241D TEAR OF MEDIAL MENISCUS OF RIGHT KNEE, CURRENT, UNSPECIFIED TEAR TYPE, SUBSEQUENT ENCOUNTER: Primary | ICD-10-CM

## 2024-03-27 PROCEDURE — 99214 OFFICE O/P EST MOD 30 MIN: CPT | Performed by: STUDENT IN AN ORGANIZED HEALTH CARE EDUCATION/TRAINING PROGRAM

## 2024-03-27 NOTE — PROGRESS NOTES
Ortho Sports Medicine Knee Follow Up Visit     Assesment:     29 y.o. female right knee horizontal tear medial meniscus extending to the root with flipped fragment     Plan:  The patient's diagnosis and treatment were discussed at length today. We discussed no treatment, non-operative treatment, and operative treatment.    Christina presents today for follow-up evaluation right knee pain and MRI review.  iPad was used for interpretation during today's visit.  We did have a lengthy discussion on her MRI that demonstrates horizontal tear medial meniscus extending from the root with flipped fragment and extrusion.  I discussed with her at length that for patients who are active and young like herself I do recommend surgical intervention, however given that she does not have insurance we did discuss at length nonsurgical treatment as well.  I discussed with her that if we did proceed forward with surgical intervention I reviewed the recovery timeline with her.  Given this at this point in time she wishes to attempt nonsurgical treatment.  I did provide her with a referral to outpatient physical therapy.  I did also recommend a short hinged knee brace which she will order off [employer] given that it is expensive out-of-pocket from our DME provider at the office today.  We did also use translation to talk with our practice administrator in regards to obtaining insurance to proceed forward with surgery.  She can continue use of ice and over-the-counter medication.  She can follow-up in approximately 2 months for reevaluation.    Conservative treatment:    Ice to knee for 20 minutes at least 1-2 times daily.  PT for ROM/strengthening to knee, hip and core.  OTC NSAIDS prn for pain.  Short hinge knee brace.    Imaging:    All imaging from today was reviewed by myself and explained to the patient.       Injection:    No Injection planned at this time.      Surgery:     Discussed surgery of right knee arthroscopy at today's  visit.  Patient wishes to attempt non-surgical treatment at this time and reach out to obtain insurance for surgical intervention at a later time.    Follow up:    Return in about 2 months (around 2024) for Recheck.        Chief Complaint   Patient presents with    Right Knee - Follow-up     MRI Review       History of Present Illness:    The patient is returns for follow up of right knee pain.  Translation used at today's visit.  She states that she continues to exhibit medial knee pain which she describes as dull and achy that is slightly improving, however activities such as deep squatting and twisting does increase her symptoms.  She denies any instability or mechanical symptoms of catching or locking.  She does still have swelling after prolonged activity.  She is currently managing her pain with ice and over-the-counter medication.    Knee Surgical History:  None    Past Medical, Social and Family History:  History reviewed. No pertinent past medical history.  Past Surgical History:   Procedure Laterality Date     SECTION       No Known Allergies  Current Outpatient Medications on File Prior to Visit   Medication Sig Dispense Refill    ibuprofen (MOTRIN) 600 mg tablet Take 1 tablet (600 mg total) by mouth every 6 (six) hours as needed for mild pain or moderate pain 30 tablet 0    Levonorgestrel (MIRENA) 20 MCG/DAY IUD 1 each by Intrauterine route once       No current facility-administered medications on file prior to visit.     Social History     Socioeconomic History    Marital status: /Civil Union     Spouse name: Not on file    Number of children: Not on file    Years of education: Not on file    Highest education level: Not on file   Occupational History    Not on file   Tobacco Use    Smoking status: Never    Smokeless tobacco: Never   Vaping Use    Vaping status: Never Used   Substance and Sexual Activity    Alcohol use: Not Currently    Drug use: Never    Sexual activity: Yes      "Birth control/protection: I.U.D.   Other Topics Concern    Not on file   Social History Narrative    Not on file     Social Determinants of Health     Financial Resource Strain: Low Risk  (4/13/2023)    Overall Financial Resource Strain (CARDIA)     Difficulty of Paying Living Expenses: Not hard at all   Food Insecurity: No Food Insecurity (4/13/2023)    Hunger Vital Sign     Worried About Running Out of Food in the Last Year: Never true     Ran Out of Food in the Last Year: Never true   Transportation Needs: No Transportation Needs (4/13/2023)    PRAPARE - Transportation     Lack of Transportation (Medical): No     Lack of Transportation (Non-Medical): No   Physical Activity: Not on file   Stress: Not on file   Social Connections: Not on file   Intimate Partner Violence: Not on file   Housing Stability: Low Risk  (4/13/2023)    Housing Stability Vital Sign     Unable to Pay for Housing in the Last Year: No     Number of Places Lived in the Last Year: 1     Unstable Housing in the Last Year: No         I have reviewed the past medical, surgical, social and family history, medications and allergies as documented in the EMR.    Review of systems: ROS is negative other than that noted in the HPI.  Constitutional: Negative for fatigue and fever.      Physical Exam:    Blood pressure 101/67, pulse 99, resp. rate 16, height 5' 4.7\" (1.643 m), weight 80.9 kg (178 lb 4.8 oz).    General/Constitutional: NAD, well developed, well nourished  HENT: Normocephalic, atraumatic  CV: Intact distal pulses, regular rate  Resp: No respiratory distress or labored breathing  Lymphatic: No lymphadenopathy palpated  Neuro: Alert and Oriented x 3, no focal deficits  Psych: Normal mood, normal affect, normal judgement, normal behavior  Skin: Warm, dry, no rashes, no erythema      Knee Exam (focused):  Visual inspection of the Right knee demonstrates normal contour without atrophy.   No previous incisions   There is no significant erythema or " edema.    No significant joint effusion   Range of motion is full from 0-130 degrees of flexion   Able to straight leg raise   No patellar tender to palpation  + medial joint line tenderness, - lateral joint line tenderness  + medial Carlos Manuel's, - lateral Carlos Manuel's  1A Lachman exam, stable posterior drawer  - dial test  Stable to varus and valgus stress at both 0 and 30°  Patella tracks normally.  No J sign.  No apprehension.  Translation is approximately 2 quadrants and is equal to the contralateral side  Patellar eversion is similar to the contralateral side    Examination of the patient's ipsilateral hip demonstrates full painless range of motion.  No crepitus.           LE NV Exam: +2 DP/PT pulses bilaterally  Sensation intact to light touch L2-S1 bilaterally    No calf tenderness to palpation bilaterally      Knee Imaging    Right knee MRI was reviewed, which demonstrates horizontal tear of the posterior horn of medial meniscus extending from the root through the body with flipped meniscal fragment into the inferior medial gutter and severe pes anserinus bursitis. I have reviewed and agree with the radiologist interpretation.      Scribe Attestation      I,:  Troy Hu am acting as a scribe while in the presence of the attending physician.:       I,:  Calvin Cheung, DO personally performed the services described in this documentation    as scribed in my presence.:

## 2024-06-04 NOTE — PROGRESS NOTES
COMP EXAM, FMX, PROBE EXAM   REVIEWED MED HX: meds, allergies, health changes reviewed in EPIC  CHIEF CONCERN:     -Last dental visit was   PAIN SCALE:  {PAIN SCALE NUMBERS:44168}  ASA CLASS:  ASA 1 - Normal health patient  PLAQUE:  {Plaque Level:85371}  CALCULUS:  {Calculus:08310}  BLEEDING:  {Bleedin}  STAIN :  {Stain:}  PERIO:     Visual and Tactile Intraoral/ Extraoral evaluation: Oral and Oropharyngeal cancer evaluation. No findings     {Exam:60625} -  Reviewed with patient clinical and radiographic findings and patient verbalized understanding. All questions and concerns addressed.     REFERRALS: {Dental referral:69277}    CARIES FINDINGS:        NEXT VISIT:   1)    Last FMX : 24

## 2024-06-05 ENCOUNTER — OFFICE VISIT (OUTPATIENT)
Dept: DENTISTRY | Facility: CLINIC | Age: 29
End: 2024-06-05

## 2024-06-05 VITALS — SYSTOLIC BLOOD PRESSURE: 110 MMHG | DIASTOLIC BLOOD PRESSURE: 75 MMHG | HEART RATE: 90 BPM

## 2024-06-05 DIAGNOSIS — Z01.20 ENCOUNTER FOR DENTAL EXAM AND CLEANING W/O ABNORMAL FINDINGS: Primary | ICD-10-CM

## 2024-06-05 PROCEDURE — D0150 COMPREHENSIVE ORAL EVALUATION - NEW OR ESTABLISHED PATIENT: HCPCS | Performed by: DENTIST

## 2024-06-05 PROCEDURE — D0210 INTRAORAL - COMPLETE SERIES OF RADIOGRAPHIC IMAGES: HCPCS

## 2024-06-05 NOTE — DENTAL PROCEDURE DETAILS
"Christina West presents for a Comprehensive exam. Verbal consent for treatment given in addition to the forms.     Reviewed health history - Patient is ASA I  Consents signed: Yes     Perio: Normal  Pain Scale: 0  Caries Assessment: Low  Radiographs: Complete mouth series          COMP EXAM, FMX, PROBE EXAM   REVIEWED MED HX: meds- Ibuprofen only, no allergies, health changes reviewed in EPIC  -pt reports injured knee meniscus  Pt's preferred language is Kenyan . Used  #[id number]  CHIEF CONCERN:     -Last dental visit was \"more than 1 year ago   -\"here for checkup\"  PAIN SCALE:  0  ASA CLASS:  ASA 1 - Normal health patient  PLAQUE:  mild  CALCULUS:  Light  BLEEDING:  light  STAIN :  None  PERIO: full probe exam completed    Visual and Tactile Intraoral/ Extraoral evaluation: Oral and Oropharyngeal cancer evaluation. No findings     Dr. Hernandez -  Reviewed with patient clinical and radiographic findings and patient verbalized understanding. All questions and concerns addressed.     REFERRALS: none    CARIES FINDINGS: 16-O  recommended extraction 1 + 16 due to lowers not there. Pt doesn't want to do ext and requests filling on 16    Nv- Adult prophy  NV- #16    Last FMX : 6/5/24  "

## 2024-06-14 ENCOUNTER — OFFICE VISIT (OUTPATIENT)
Dept: OBGYN CLINIC | Facility: CLINIC | Age: 29
End: 2024-06-14

## 2024-06-14 VITALS
HEIGHT: 65 IN | BODY MASS INDEX: 29.72 KG/M2 | RESPIRATION RATE: 17 BRPM | WEIGHT: 178.4 LBS | SYSTOLIC BLOOD PRESSURE: 112 MMHG | DIASTOLIC BLOOD PRESSURE: 73 MMHG | HEART RATE: 74 BPM

## 2024-06-14 DIAGNOSIS — S83.241D TEAR OF MEDIAL MENISCUS OF RIGHT KNEE, CURRENT, UNSPECIFIED TEAR TYPE, SUBSEQUENT ENCOUNTER: Primary | ICD-10-CM

## 2024-06-14 PROCEDURE — 99213 OFFICE O/P EST LOW 20 MIN: CPT | Performed by: STUDENT IN AN ORGANIZED HEALTH CARE EDUCATION/TRAINING PROGRAM

## 2024-06-14 NOTE — PROGRESS NOTES
Ortho Sports Medicine Knee Follow Up Visit     Assesment:     29 y.o. female right knee horizontal tear medial meniscus extending to the root with flipped fragment     Plan:    Christina presents today for follow up for right knee medial mensicus tear. We discussed she can continue with at home exercises with the best being walking, biking and swimming as these are activities that limit weightbearing status. She should avoid high impact activities as these will increase her symptoms. I recommend she continue wearing the knee brace to help with knee support during activity. We discussed if she reaches a point where she can't live with this anymore, we can do a surgery to address the meniscus. We will hold off on any surgery at this time as I do not want it to be a financial burden due to her lack of insurance. She can follow up with me PRN.    Conservative treatment:    OTC NSAIDS prn for pain.  Tylenol for pain.  Let pain guide gradual return activities.  Continue wearing knee brace for support.  Continue at home exercise program.    Imaging:    All imaging from today was reviewed by myself and explained to the patient.       Injection:    No Injection planned at this time.      Surgery:     No surgery is recommended at this point, continue with conservative treatment plan as noted.    Follow up:    No follow-ups on file.        Chief Complaint   Patient presents with    Right Knee - Follow-up       History of Present Illness:    The patient is returns for follow up of horizontal tear medial meniscus extending to the root with flipped fragment. Since the prior visit, She reports moderate improvement. She states she has was going to physical therapy, but has transitioned to a home exercise program. She feels like she is improving but still has pain with various movements. She has the most pain at night after activity during the day. She has been compliant with wearing her knee brace to help with knee support. She denies  any new trauma or injury. She denies any numbness and tingling.        Knee Surgical History:  None    Past Medical, Social and Family History:  History reviewed. No pertinent past medical history.  Past Surgical History:   Procedure Laterality Date     SECTION       No Known Allergies  Current Outpatient Medications on File Prior to Visit   Medication Sig Dispense Refill    ibuprofen (MOTRIN) 600 mg tablet Take 1 tablet (600 mg total) by mouth every 6 (six) hours as needed for mild pain or moderate pain 30 tablet 0    Levonorgestrel (MIRENA) 20 MCG/DAY IUD 1 each by Intrauterine route once       No current facility-administered medications on file prior to visit.     Social History     Socioeconomic History    Marital status: /Civil Union     Spouse name: Not on file    Number of children: Not on file    Years of education: Not on file    Highest education level: Not on file   Occupational History    Not on file   Tobacco Use    Smoking status: Never    Smokeless tobacco: Never   Vaping Use    Vaping status: Never Used   Substance and Sexual Activity    Alcohol use: Not Currently    Drug use: Never    Sexual activity: Yes     Birth control/protection: I.U.D.   Other Topics Concern    Not on file   Social History Narrative    Not on file     Social Determinants of Health     Financial Resource Strain: Low Risk  (2023)    Overall Financial Resource Strain (CARDIA)     Difficulty of Paying Living Expenses: Not hard at all   Food Insecurity: No Food Insecurity (2023)    Hunger Vital Sign     Worried About Running Out of Food in the Last Year: Never true     Ran Out of Food in the Last Year: Never true   Transportation Needs: No Transportation Needs (2023)    PRAPARE - Transportation     Lack of Transportation (Medical): No     Lack of Transportation (Non-Medical): No   Physical Activity: Not on file   Stress: Not on file   Social Connections: Not on file   Intimate Partner Violence: Not on  "file   Housing Stability: Low Risk  (4/13/2023)    Housing Stability Vital Sign     Unable to Pay for Housing in the Last Year: No     Number of Places Lived in the Last Year: 1     Unstable Housing in the Last Year: No         I have reviewed the past medical, surgical, social and family history, medications and allergies as documented in the EMR.    Review of systems: ROS is negative other than that noted in the HPI.  Constitutional: Negative for fatigue and fever.      Physical Exam:    Blood pressure 112/73, pulse 74, resp. rate 17, height 5' 4.7\" (1.643 m), weight 80.9 kg (178 lb 6.4 oz).    General/Constitutional: NAD, well developed, well nourished  HENT: Normocephalic, atraumatic  CV: Intact distal pulses, regular rate  Resp: No respiratory distress or labored breathing  Lymphatic: No lymphadenopathy palpated  Neuro: Alert and Oriented x 3, no focal deficits  Psych: Normal mood, normal affect, normal judgement, normal behavior  Skin: Warm, dry, no rashes, no erythema      Knee Exam (focused):  Visual inspection of the right knee demonstrates normal contour without atrophy.   no previous incisions   There is no significant erythema or edema.    No significant joint effusion   Range of motion is full from 0-130 degrees of flexion   Able to straight leg raise   (+) medial joint line tenderness, (-) lateral joint line tenderness  (+) medial Carlos Manuel's, (-) lateral Carlos Manuel's  1A Lachman exam, (-) posterior drawer  Stable to varus and valgus stress at both 0 and 30°  Patella tracks normally.  No J sign.  No apprehension.  Translation is approximately 2 quadrants and is equal to the contralateral side  Patellar eversion is similar to the contralateral side    Examination of the patient's ipsilateral hip demonstrates full painless range of motion.  No crepitus.           LE NV Exam: +2 DP/PT pulses bilaterally  Sensation intact to light touch L2-S1 bilaterally    No calf tenderness to palpation " bilaterally      Knee Imaging    No imaging was performed today      Scribe Attestation      I,:  Ambrose Reilly am acting as a scribe while in the presence of the attending physician.:       I,:  Calvin Cheung, DO personally performed the services described in this documentation    as scribed in my presence.:

## 2024-09-20 ENCOUNTER — OFFICE VISIT (OUTPATIENT)
Dept: DENTISTRY | Facility: CLINIC | Age: 29
End: 2024-09-20

## 2024-09-20 VITALS — HEART RATE: 89 BPM | DIASTOLIC BLOOD PRESSURE: 76 MMHG | SYSTOLIC BLOOD PRESSURE: 126 MMHG

## 2024-09-20 DIAGNOSIS — Z01.20 ENCOUNTER FOR DENTAL EXAM AND CLEANING W/O ABNORMAL FINDINGS: Primary | ICD-10-CM

## 2024-09-20 PROCEDURE — D1110 PROPHYLAXIS - ADULT: HCPCS

## 2024-09-20 NOTE — PROGRESS NOTES
ADULT PROPHY    REVIEWED MED HX: meds, allergies, health changes reviewed in [facility]. All consents signed.  CHIEF CONCERN: no dental pain or concerns  PAIN SCALE:  0  ASA CLASS:  I  PLAQUE:  {Plaque Level:82255}  CALCULUS:  {None light moderate heavy:28195}  BLEEDING:   {None light moderate heavy:49508}  STAIN :   {None light moderate heavy:81295}      PERIO:     Hygiene Procedures:  Scaled, Polished, Flossed and Used Cavitron    Oral Hygiene Instruction: Brushing Minimum 2x daily for 2 minutes, daily flossing and Reviewed dietary precautions    Dispensed: Toothbrush, Toothpaste, and Floss    Visual and Tactile Intraoral/ Extraoral evaluation: Oral and Oropharyngeal cancer evaluation. No findings     {Exam:62666}   Reviewed with patient clinical and radiographic findings and patient verbalized understanding. All questions and concerns addressed.     REFERRALS: none    CARIES FINDINGS:        TREATMENT  PLAN :   NV:     Next Recall: 6 month recall     Last [facility]/ FMX : 6/5/24

## 2024-09-20 NOTE — DENTAL PROCEDURE DETAILS
ADULT PROPHY    REVIEWED MED HX: meds, allergies, health changes reviewed in [facility]. All consents signed.  CHIEF CONCERN: no dental pain or concerns  PAIN SCALE:  0  ASA CLASS:  I  PLAQUE:  moderate  CALCULUS:  moderate  BLEEDING:   moderate  STAIN :   light      PERIO: gingivitis present    Hygiene Procedures:  Scaled, Polished, Flossed and Used Cavitron    Oral Hygiene Instruction: Brushing Minimum 2x daily for 2 minutes, daily flossing and Reviewed dietary precautions    Dispensed: Toothbrush, Toothpaste, and Floss    Visual and Tactile Intraoral/ Extraoral evaluation: Oral and Oropharyngeal cancer evaluation. No findings     Exam: no exam due    REFERRALS: none    CARIES FINDINGS:        TREATMENT  PLAN :   NV: fillings    Next Recall: 6 month recall     Last [facility]/ FMX : 6/5/24

## 2025-02-12 ENCOUNTER — HOSPITAL ENCOUNTER (EMERGENCY)
Facility: HOSPITAL | Age: 30
Discharge: HOME | End: 2025-02-12
Attending: EMERGENCY MEDICINE

## 2025-02-12 ENCOUNTER — APPOINTMENT (EMERGENCY)
Dept: RADIOLOGY | Facility: HOSPITAL | Age: 30
End: 2025-02-12

## 2025-02-12 VITALS
SYSTOLIC BLOOD PRESSURE: 134 MMHG | HEIGHT: 65 IN | DIASTOLIC BLOOD PRESSURE: 69 MMHG | OXYGEN SATURATION: 97 % | BODY MASS INDEX: 29.38 KG/M2 | HEART RATE: 106 BPM | RESPIRATION RATE: 20 BRPM | WEIGHT: 176.37 LBS | TEMPERATURE: 98.5 F

## 2025-02-12 DIAGNOSIS — J10.1 INFLUENZA A: Primary | ICD-10-CM

## 2025-02-12 LAB
FLUAV RNA RESP QL NAA+PROBE: POSITIVE
FLUBV RNA RESP QL NAA+PROBE: NEGATIVE
RSV RNA RESP QL NAA+PROBE: NEGATIVE
SARS-COV-2 RNA RESP QL NAA+PROBE: NEGATIVE

## 2025-02-12 PROCEDURE — 99284 EMERGENCY DEPT VISIT MOD MDM: CPT | Performed by: PHYSICIAN ASSISTANT

## 2025-02-12 PROCEDURE — 71046 X-RAY EXAM CHEST 2 VIEWS: CPT

## 2025-02-12 PROCEDURE — 0241U HB NFCT DS VIR RESP RNA 4 TRGT: CPT | Performed by: PHYSICIAN ASSISTANT

## 2025-02-12 PROCEDURE — 99283 EMERGENCY DEPT VISIT LOW MDM: CPT

## 2025-02-12 RX ORDER — IBUPROFEN 600 MG/1
600 TABLET, FILM COATED ORAL ONCE
Status: COMPLETED | OUTPATIENT
Start: 2025-02-12 | End: 2025-02-12

## 2025-02-12 RX ADMIN — IBUPROFEN 600 MG: 600 TABLET, FILM COATED ORAL at 13:06

## 2025-02-12 NOTE — ED PROVIDER NOTES
"Time reflects when diagnosis was documented in both MDM as applicable and the Disposition within this note       Time User Action Codes Description Comment    2025  1:33 PM Apryl Estrella Add [J10.1] Influenza A           ED Disposition       ED Disposition   Discharge    Condition   Stable    Date/Time     1:33 PM    Comment   Christina West discharge to home/self care.                   Assessment & Plan       Medical Decision Making  PT with uri sx, son has FLU, pt worried about cough.  Pt asking for \"a shot for pain\", but Motrin had already been given; no indication for opiates for flu  + flu  Stable for dc, otc medication recommendations    Amount and/or Complexity of Data Reviewed  Labs: ordered. Decision-making details documented in ED Course.  Radiology: ordered and independent interpretation performed. Decision-making details documented in ED Course.    Risk  OTC drugs.  Prescription drug management.        ED Course as of 25 1730   Wed 2025   1328 INFLU A PCR(!): Positive       Medications   ibuprofen (MOTRIN) tablet 600 mg (600 mg Oral Given 25 1306)       ED Risk Strat Scores                                              History of Present Illness       Chief Complaint   Patient presents with    Flu Symptoms     Flu symptoms including cough, fatigue, headache, and body aches; son is positive for flu. Symptoms started a few days ago.        History reviewed. No pertinent past medical history.   Past Surgical History:   Procedure Laterality Date     SECTION        Family History   Problem Relation Age of Onset    No Known Problems Mother     No Known Problems Father     No Known Problems Son     No Known Problems Maternal Grandmother     Breast cancer Neg Hx     Colon cancer Neg Hx     Ovarian cancer Neg Hx       Social History     Tobacco Use    Smoking status: Never    Smokeless tobacco: Never   Vaping Use    Vaping status: Never Used   Substance Use " Topics    Alcohol use: Not Currently    Drug use: Never      E-Cigarette/Vaping    E-Cigarette Use Never User       E-Cigarette/Vaping Substances      I have reviewed and agree with the history as documented.     HPI: Patient is a 30 y.o. female who presents with 2-3 days of tactile fever-none noted here, chills, dry cough, congestion, intermittent headache, and myalgias which the patient describes at mild   concerned about cough.  Dry cough heard here.  The patient has had contact with people with similar symptoms, son just dx with FLU   The patient took Tylenol today.  Pt denies chance of pregnancy    No Known Allergies    History reviewed. No pertinent past medical history.   Past Surgical History:  No date:  SECTION  Social History    Tobacco Use      Smoking status: Never      Smokeless tobacco: Never    Vaping Use      Vaping status: Never Used    Alcohol use: Not Currently    Drug use: Never      Nursing notes reviewed  Physical Exam:  ED Triage Vitals [25 1217]  Temperature: 98.5 °F (36.9 °C)  Pulse: (!) 106  Respirations: 20  Blood Pressure: 134/69  SpO2: 97 %  Temp Source: Oral  Heart Rate Source: Monitor  Patient Position - Orthostatic VS: Sitting  BP Location: Right arm  FiO2 (%): n/a  Pain Score: 8    ROS: Positive for tactile fever, chills, dry cough, congestion, headache, and myalgias wh, the remainder of a 10 organ system ROS was otherwise unremarkable.    General: awake, alert, mild distress  Head: normocephalic, atraumatic  Eyes: no scleral icterus  Ears: external ears normal, hearing grossly intact, TM intact no erythema/bulging  Pharynx: no erythema/exudate, uvula midline no shift  Nose: external exam grossly normal, positive nasal discharge  Neck: symmetric, No JVD noted, trachea midline  Pulmonary: no respiratory distress, no tachypnea noted, cta no wheezing/rhonchi  Cardiovascular: appears well perfused  Abdomen: no distention noted, non tender  Musculoskeletal: no deformities  noted, tone normal  Neuro: grossly non-focal  Psych: mood and affect appropriate    The patient is stable and has a history and physical exam consistent with a viral illness, pneumonia, rhinosinusitis, dehydration, pharyngitis.   Viral panel testing has been performed.  I considered the patient's other medical conditions as applicable/noted above in my medical decision making.  The patient is stable upon discharge. The plan is for supportive care at home.    The patient (and any family present) verbalized understanding of the discharge instructions and warnings that would necessitate return to the Emergency Department.  All questions were answered prior to discharge.    Medications - No data to display  Final diagnoses:  None  ED Disposition     None      Follow-up Information    None     Patient's Medications    Discharge Prescriptions  No medications on file    No discharge procedures on file.    Electronically Signed by            Review of Systems        Objective       ED Triage Vitals [02/12/25 1217]   Temperature Pulse Blood Pressure Respirations SpO2 Patient Position - Orthostatic VS   98.5 °F (36.9 °C) (!) 106 134/69 20 97 % Sitting      Temp Source Heart Rate Source BP Location FiO2 (%) Pain Score    Oral Monitor Right arm -- 8      Vitals      Date and Time Temp Pulse SpO2 Resp BP Pain Score FACES Pain Rating User   02/12/25 1306 -- -- -- -- -- 8 -- SR   02/12/25 1217 98.5 °F (36.9 °C) 106 97 % 20 134/69 8 -- KLB            Physical Exam    Results Reviewed       Procedure Component Value Units Date/Time    FLU/RSV/COVID - if FLU/RSV clinically relevant (2hr TAT) [[id number]]  (Abnormal) Collected: 02/12/25 1236    Lab Status: Final result Specimen: Nares from Nose Updated: 02/12/25 1325     SARS-CoV-2 Negative     INFLUENZA A PCR Positive     INFLUENZA B PCR Negative     RSV PCR Negative    Narrative:      This test has been performed using the CoV-2/Flu/RSV plus assay on the [employer] GeneXpert platform.  This test has been validated by the  and verified by the performing laboratory.     This test is designed to amplify and detect the following: nucleocapsid (N), envelope (E), and RNA-dependent RNA polymerase (RdRP) genes of the SARS-CoV-2 genome; matrix (M), basic polymerase (PB2), and acidic protein (PA) segments of the influenza A genome; matrix (M) and non-structural protein (NS) segments of the influenza B genome, and the nucleocapsid genes of RSV A and RSV B.     Positive results are indicative of the presence of Flu A, Flu B, RSV, and/or SARS-CoV-2 RNA. Positive results for SARS-CoV-2 or suspected novel influenza should be reported to state, local, or federal health departments according to local reporting requirements.      All results should be assessed in conjunction with clinical presentation and other laboratory markers for clinical management.     FOR PEDIATRIC PATIENTS - copy/paste COVID Guidelines URL to browser: [email or web address]               XR chest 2 views   ED Interpretation by Apryl Estrella PA-C ( [id number])   NAD, pt with likely FLU      Final Interpretation by Bradley Landon Kocher, MD ( [id number])      No acute cardiopulmonary disease.            Workstation performed: [id number]             Procedures    ED Medication and Procedure Management   Prior to Admission Medications   Prescriptions Last Dose Informant Patient Reported? Taking?   Levonorgestrel (MIRENA) 20 MCG/DAY IUD  Self Yes No   Si each by Intrauterine route once   ibuprofen (MOTRIN) 600 mg tablet  Self No No   Sig: Take 1 tablet (600 mg total) by mouth every 6 (six) hours as needed for mild pain or moderate pain      Facility-Administered Medications: None     Discharge Medication List as of 2025  1:35 PM        CONTINUE these medications which have NOT CHANGED    Details   ibuprofen (MOTRIN) 600 mg tablet Take 1 tablet (600 mg total) by mouth  every 6 (six) hours as needed for mild pain or moderate pain, Starting Wed 3/6/2024, Normal      Levonorgestrel (MIRENA) 20 MCG/DAY IUD 1 each by Intrauterine route once, Historical Med           No discharge procedures on file.  ED SEPSIS DOCUMENTATION   Time reflects when diagnosis was documented in both MDM as applicable and the Disposition within this note       Time User Action Codes Description Comment    2/12/2025  1:33 PM Apryl Estrella Add [J10.1] Influenza A                  Apryl Estrella PA-C  02/12/25 1738

## 2025-02-12 NOTE — Clinical Note
Christina West was seen and treated in our emergency department on 2/12/2025.                Diagnosis:     Christina  .    She may return on this date:     FLU +  Return to work when fever free for 24 hours without medication and feeling better     If you have any questions or concerns, please don't hesitate to call.      Apryl Estrella PA-C    ______________________________           _______________          _______________  Hospital Representative                              Date                                Time

## 2025-02-12 NOTE — DISCHARGE INSTRUCTIONS
Use Tylenol every 4 hours or Motrin every 6 hours; you can alternate the 2 medications taking something every 3 hours for pain or fever.      You can try over the counter Robitussion DM, Dayquil/Nyquil for cough.    Follow-up with your doctor in next few days.    Labs Reviewed   COVID19, INFLUENZA A/B, RSV PCR, [facility] - Abnormal       Result Value Ref Range Status    SARS-CoV-2 Negative  Negative Final    INFLUENZA A PCR Positive (*) Negative Final    INFLUENZA B PCR Negative  Negative Final    RSV PCR Negative  Negative Final    Narrative:     This test has been performed using the CoV-2/Flu/RSV plus assay on the [employer] GeneXpert platform. This test has been validated by the  and verified by the performing laboratory.     This test is designed to amplify and detect the following: nucleocapsid (N), envelope (E), and RNA-dependent RNA polymerase (RdRP) genes of the SARS-CoV-2 genome; matrix (M), basic polymerase (PB2), and acidic protein (PA) segments of the influenza A genome; matrix (M) and non-structural protein (NS) segments of the influenza B genome, and the nucleocapsid genes of RSV A and RSV B.     Positive results are indicative of the presence of Flu A, Flu B, RSV, and/or SARS-CoV-2 RNA. Positive results for SARS-CoV-2 or suspected novel influenza should be reported to state, local, or federal health departments according to local reporting requirements.      All results should be assessed in conjunction with clinical presentation and other laboratory markers for clinical management.     FOR PEDIATRIC PATIENTS - copy/paste COVID Guidelines URL to browser: [email or web address]